# Patient Record
Sex: MALE | Race: WHITE | Employment: FULL TIME | URBAN - METROPOLITAN AREA
[De-identification: names, ages, dates, MRNs, and addresses within clinical notes are randomized per-mention and may not be internally consistent; named-entity substitution may affect disease eponyms.]

---

## 2017-10-04 ENCOUNTER — THERAPY VISIT (OUTPATIENT)
Dept: PHYSICAL THERAPY | Facility: CLINIC | Age: 52
End: 2017-10-04
Payer: COMMERCIAL

## 2017-10-04 DIAGNOSIS — M25.562 LEFT KNEE PAIN: Primary | ICD-10-CM

## 2017-10-04 DIAGNOSIS — M77.9 ENTHESOPATHY: ICD-10-CM

## 2017-10-04 PROCEDURE — 97110 THERAPEUTIC EXERCISES: CPT | Mod: GP | Performed by: PHYSICAL THERAPIST

## 2017-10-04 PROCEDURE — 97140 MANUAL THERAPY 1/> REGIONS: CPT | Mod: GP | Performed by: PHYSICAL THERAPIST

## 2017-10-04 PROCEDURE — 97161 PT EVAL LOW COMPLEX 20 MIN: CPT | Mod: GP | Performed by: PHYSICAL THERAPIST

## 2017-10-04 NOTE — LETTER
[facility] ATHLETIC [facility] PHYSICAL [facility]  [address] [address]  [phone]    2017    Re: Nikunj Cooper   :   1965  MRN:  [id number]   REFERRING PHYSICIAN:   Matt Fernandez    [facility]    Date of Initial Evaluation:  10/04/2017  Visits:  1  Rxs Used: 1  Reason for Referral:     Enthesopathy  Left knee pain    EVALUATION SUMMARY    Subjective:  Patient is a 51 year old male presenting with rehab left knee hpi. The history is provided by the patient. No  was used.   Nikunj Cooper is a 51 year old male with a left knee condition.  Condition occurred with:  Repetition/overuse.  Condition occurred: in the community.  This is a chronic condition  Date of orders 17. 6 mo ago experienced med L knee pain with walking, pretty consistently at 20 minutes. Worsened over time, saw Dr Fernandez who suspected a torn meniscus. MRI showed HS tendinopathy.   Advil 3x/day x 3 wks with improvement.  Social: Walking regularly, desk job.    Patient reports pain:  Medial.  Radiates to:  Knee.  Pain is described as aching and is intermittent and reported as 1/10.  Associated symptoms:  Loss of motion/stiffness. Pain is worse in the P.M..  Symptoms are exacerbated by ascending stairs, descending stairs and walking and relieved by rest and NSAID's.  Since onset symptoms are gradually improving.  Special tests:  MRI.      General health as reported by patient is excellent.                Objective:  Standing Alignment:   Ankle/Foot:  Pes planus L and pes planus R  Gait:    Weight Bearing Status:  WBAT   Assistive Devices:  None  Deviations:  Knee:  Knee extension decr L  Knee Evaluation:  ROM:  Prom wnl knee: dec TKE L knee.  Strength wnl knee: good QS B, HS strength 5/5 at 90 and 30 deg knee flex B, Glute max 4/5 B, glute med 4+/5 R, 3+/5 L.  Special Tests:   Left knee negative for the  following special tests:  Meninscal  Palpation:    Left knee tenderness present at:  Medial Joint Line (pes bursa); Popliteal; Semitendinosus (congestion at distal 1/3 and tendon ) and Semembranosus    Re: Nikunj Cooper   :   1965    Mobility Testing:  Mobility testing: Supine SLR decreased L with reproduction of knee pain.  Functional Testing:    Quad:    Single Leg Squat:  Left:      Right:        Bilateral Leg Squat:   Mild loss of control    Proprioception:   Stork Balance Test: Left:   Right:   % of Uninvolved:  WNL B, no inc pain with L SLS    Assessment/Plan:    Patient is a 51 year old male with left side knee complaints.    Patient has the following significant findings with corresponding treatment plan.                Diagnosis 1:  L knee distal HS tendinopathy  Pain -  manual therapy, self management, education and home program  Decreased ROM/flexibility - manual therapy, therapeutic exercise and home program  Decreased joint mobility - manual therapy, therapeutic exercise and home program  Decreased strength - therapeutic exercise, therapeutic activities and home program  Decreased proprioception - neuro re-education, gait training, therapeutic activities and home program  Therapy Evaluation Codes:   1) History comprised of:   Personal factors that impact the plan of care:      Age, Living environment, Past/current experiences, Social history/culture, Time since onset of symptoms and Work status.    Comorbidity factors that impact the plan of care are:      None.     Medications impacting care: Anti inflammatory.  2) Examination of Body Systems comprised of:   Body structures and functions that impact the plan of care:      Knee.   Activity limitations that impact the plan of care are:      Jumping, Lifting, Running, Squatting/kneeling, Stairs and Walking.  3) Clinical presentation characteristics are:   Stable/Uncomplicated.  4) Decision-Making    Low complexity using standardized  patient assessment instrument and/or measureable assessment of functional outcome.  Cumulative Therapy Evaluation is: Low complexity.  Previous and current functional limitations:  (See Goal Flow Sheet for this information)    Short term and Long term goals: (See Goal Flow Sheet for this information)   Communication ability:  Patient appears to be able to clearly communicate and understand verbal and written communication and follow directions correctly.  Treatment Explanation - The following has been discussed with the patient:   RX ordered/plan of care  Anticipated outcomes  Possible risks and side effects  This patient would benefit from PT intervention to resume normal activities.   Rehab potential is good.  Re: Nikunj Cooper   :   1965    Frequency:  1 X week, once daily  Duration:  for 4 weeks tapering to 2 X a month over 4 weeks  Discharge Plan:  Achieve all LTG.  Independent in home treatment program.  Return to previous functional level by discharge.  Reach maximal therapeutic benefit.                Thank you for your referral.    INQUIRIES  Therapist: Jess Cook, PT  [facility] [place] [facility]  [address] [place] [address]  Phone: [phone]  Fax: [phone]

## 2017-10-04 NOTE — MR AVS SNAPSHOT
"              After Visit Summary   10/4/2017    Nikunj Cooper    MRN: [id number]           Patient Information     Date Of Birth          1965        Visit Information        Provider Department      10/4/2017 10:10 AM Jess Cook PT [facility] Athletic [facility] Physical [facility]        Today's Diagnoses     Left knee pain    -  1    Enthesopathy           Follow-ups after your visit        Who to contact     If you have questions or need follow up information about today's clinic visit or your schedule please contact [facility] ATHLETIC [facility] PHYSICAL [facility] directly at [phone].  Normal or non-critical lab and imaging results will be communicated to you by Meliuzhart, letter or phone within 4 business days after the clinic has received the results. If you do not hear from us within 7 days, please contact the clinic through [employer] or phone. If you have a critical or abnormal lab result, we will notify you by phone as soon as possible.  Submit refill requests through EZ[employer] or call your pharmacy and they will forward the refill request to us. Please allow 3 business days for your refill to be completed.          Additional Information About Your Visit        MyChart Information     EZChip lets you send messages to your doctor, view your test results, renew your prescriptions, schedule appointments and more. To sign up, go to [email or web address] . Click on \"Log in\" on the left side of the screen, which will take you to the Welcome page. Then click on \"Sign up Now\" on the right side of the page.     You will be asked to enter the access code listed below, as well as some personal information. Please follow the directions to create your username and password.     Your access code is: [email or web address]  Expires: 2018 11:37 AM     Your access code will  in 90 days. If you need help or a new code, please call your [place] clinic or [phone].   "      Care EveryWhere ID     This is your Care EveryWhere ID. This could be used by other organizations to access your [place] medical records  [id number]         Blood Pressure from Last 3 Encounters:   10/14/14 110/74    Weight from Last 3 Encounters:   10/14/14 87.1 kg (192 lb)              We Performed the Following     KOLBY Inital Eval Report     Manual Ther Tech, 1+Regions, EA 15 min     PT Eval, Low Complexity (26929)     Therapeutic Exercises        Primary Care Provider    None Specified       No primary provider on file.        Equal Access to Services     [facility] : Hadii aad ku hadasho Soomaali, waaxda luqadaha, qaybta kaalmada adeegyada, waxay fadiain hayanjalin paula londono . So [facility] [phone].    ATENCIÓN: Si habla español, tiene a escamilla disposición servicios gratuitos de asistencia lingüística. Llame al [phone].    We comply with applicable federal civil rights laws and [place] laws. We do not discriminate on the basis of race, color, national origin, age, disability, sex, sexual orientation, or gender identity.            Thank you!     Thank you for choosing [place] [facility] PHYSICAL THERAPY  for your care. Our goal is always to provide you with excellent care. Hearing back from our patients is one way we can continue to improve our services. Please take a few minutes to complete the written survey that you may receive in the mail after your visit with us. Thank you!             Your Updated Medication List - Protect others around you: Learn how to safely use, store and throw away your medicines at www.disposemymeds.org.          This list is accurate as of: 10/4/17 11:37 AM.  Always use your most recent med list.                   Brand Name Dispense Instructions for use Diagnosis    IBUPROFEN PO           PRILOSEC PO

## 2017-10-04 NOTE — PROGRESS NOTES
Subjective:    Patient is a 51 year old male presenting with rehab left knee hpi. The history is provided by the patient. No  was used.   Nikunj Cooper is a 51 year old male with a left knee condition.  Condition occurred with:  Repetition/overuse.  Condition occurred: in the community.  This is a chronic condition  Date of orders 9/13/17. 6 mo ago experienced med L knee pain with walking, pretty consistently at 20 minutes. Worsened over time, saw Dr Fernandez who suspected a torn meniscus. MRI showed HS tendinopathy.     Advil 3x/day x 3 wks with improvement.    Social: Walking regularly, desk job.    Patient reports pain:  Medial.  Radiates to:  Knee.  Pain is described as aching and is intermittent and reported as 1/10.  Associated symptoms:  Loss of motion/stiffness. Pain is worse in the P.M..  Symptoms are exacerbated by ascending stairs, descending stairs and walking and relieved by rest and NSAID's.  Since onset symptoms are gradually improving.  Special tests:  MRI.      General health as reported by patient is excellent.                                              Objective:    Standing Alignment:                Ankle/Foot:  Pes planus L and pes planus R    Gait:    Weight Bearing Status:  WBAT   Assistive Devices:  None  Deviations:  Knee:  Knee extension decr L                                                      Knee Evaluation:  ROM:  Prom wnl knee: dec TKE L knee.  Strength wnl knee: good QS B, HS strength 5/5 at 90 and 30 deg knee flex B, Glute max 4/5 B, glute med 4+/5 R, 3+/5 L.              Special Tests:     Left knee negative for the following special tests:  Meninscal    Palpation:    Left knee tenderness present at:  Medial Joint Line (pes bursa); Popliteal; Semitendinosus (congestion at distal 1/3 and tendon ) and Semembranosus      Mobility Testing:  Mobility testing: Supine SLR decreased L with reproduction of knee pain.            Functional Testing:          Quad:     Single Leg Squat:  Left:      Right:        Bilateral Leg Squat:   Mild loss of control      Proprioception:   Stork Balance Test: Left:   Right:   % of Uninvolved:  WNL B, no inc pain with L SLS          General     ROS    Assessment/Plan:      Patient is a 51 year old male with left side knee complaints.    Patient has the following significant findings with corresponding treatment plan.                Diagnosis 1:  L knee distal HS tendinopathy  Pain -  manual therapy, self management, education and home program  Decreased ROM/flexibility - manual therapy, therapeutic exercise and home program  Decreased joint mobility - manual therapy, therapeutic exercise and home program  Decreased strength - therapeutic exercise, therapeutic activities and home program  Decreased proprioception - neuro re-education, gait training, therapeutic activities and home program    Therapy Evaluation Codes:   1) History comprised of:   Personal factors that impact the plan of care:      Age, Living environment, Past/current experiences, Social history/culture, Time since onset of symptoms and Work status.    Comorbidity factors that impact the plan of care are:      None.     Medications impacting care: Anti inflammatory.  2) Examination of Body Systems comprised of:   Body structures and functions that impact the plan of care:      Knee.   Activity limitations that impact the plan of care are:      Jumping, Lifting, Running, Squatting/kneeling, Stairs and Walking.  3) Clinical presentation characteristics are:   Stable/Uncomplicated.  4) Decision-Making    Low complexity using standardized patient assessment instrument and/or measureable assessment of functional outcome.  Cumulative Therapy Evaluation is: Low complexity.    Previous and current functional limitations:  (See Goal Flow Sheet for this information)    Short term and Long term goals: (See Goal Flow Sheet for this information)     Communication ability:  Patient appears  to be able to clearly communicate and understand verbal and written communication and follow directions correctly.  Treatment Explanation - The following has been discussed with the patient:   RX ordered/plan of care  Anticipated outcomes  Possible risks and side effects  This patient would benefit from PT intervention to resume normal activities.   Rehab potential is good.    Frequency:  1 X week, once daily  Duration:  for 4 weeks tapering to 2 X a month over 4 weeks  Discharge Plan:  Achieve all LTG.  Independent in home treatment program.  Return to previous functional level by discharge.  Reach maximal therapeutic benefit.    Please refer to the daily flowsheet for treatment today, total treatment time and time spent performing 1:1 timed codes.

## 2017-12-08 ENCOUNTER — THERAPY VISIT (OUTPATIENT)
Dept: PHYSICAL THERAPY | Facility: CLINIC | Age: 52
End: 2017-12-08
Payer: COMMERCIAL

## 2017-12-08 DIAGNOSIS — M25.519 SHOULDER PAIN: Primary | ICD-10-CM

## 2017-12-08 PROCEDURE — 97110 THERAPEUTIC EXERCISES: CPT | Mod: GP | Performed by: PHYSICAL THERAPIST

## 2017-12-08 PROCEDURE — 97161 PT EVAL LOW COMPLEX 20 MIN: CPT | Mod: GP | Performed by: PHYSICAL THERAPIST

## 2017-12-08 NOTE — MR AVS SNAPSHOT
"              After Visit Summary   2017    Nikunj Cooper    MRN: [id number]           Patient Information     Date Of Birth          1965        Visit Information        Provider Department      2017 11:30 AM Jess Cook PT [facility] Athletic [facility] Physical [facility]        Today's Diagnoses     Shoulder pain    -  1       Follow-ups after your visit        Who to contact     If you have questions or need follow up information about today's clinic visit or your schedule please contact [facility] ATHLETIC [facility] PHYSICAL [facility] directly at [phone].  Normal or non-critical lab and imaging results will be communicated to you by [employer], letter or phone within 4 business days after the clinic has received the results. If you do not hear from us within 7 days, please contact the clinic through [employer] or phone. If you have a critical or abnormal lab result, we will notify you by phone as soon as possible.  Submit refill requests through [email or web address] or call your pharmacy and they will forward the refill request to us. Please allow 3 business days for your refill to be completed.          Additional Information About Your Visit        MyChart Information     [email or web address] lets you send messages to your doctor, view your test results, renew your prescriptions, schedule appointments and more. To sign up, go to [email or web address] . Click on \"Log in\" on the left side of the screen, which will take you to the Welcome page. Then click on \"Sign up Now\" on the right side of the page.     You will be asked to enter the access code listed below, as well as some personal information. Please follow the directions to create your username and password.     Your access code is: [email or web address]  Expires: 2018 10:37 AM     Your access code will  in 90 days. If you need help or a new code, please call your [place] clinic or [phone].        Care EveryWhere " ID     This is your Care EveryWhere ID. This could be used by other organizations to access your [place] medical records  [id number]         Blood Pressure from Last 3 Encounters:   10/14/14 110/74    Weight from Last 3 Encounters:   10/14/14 87.1 kg (192 lb)              We Performed the Following     KOLBY Inital Eval Report     PT Eval, Low Complexity (41136)     Therapeutic Exercises        Primary Care Provider    None Specified       No primary provider on file.        Equal Access to Services     JORDAN VILLANUEVA : Hadii aad ku hadasho Soomaali, waaxda luqadaha, qaybta kaalmada adeegyada, waxay fadiain hira dobbinsmellyjacqueline londono . So [facility] [phone].    ATENCIÓN: Si habla español, tiene a escamilla disposición servicios gratuitos de asistencia lingüística. Llame al [phone].    We comply with applicable federal civil rights laws and [place] laws. We do not discriminate on the basis of race, color, national origin, age, disability, sex, sexual orientation, or gender identity.            Thank you!     Thank you for choosing [facility] PHYSICAL THERAPY  for your care. Our goal is always to provide you with excellent care. Hearing back from our patients is one way we can continue to improve our services. Please take a few minutes to complete the written survey that you may receive in the mail after your visit with us. Thank you!             Your Updated Medication List - Protect others around you: Learn how to safely use, store and throw away your medicines at www.disposemymeds.org.          This list is accurate as of: 12/8/17 11:59 PM.  Always use your most recent med list.                   Brand Name Dispense Instructions for use Diagnosis    IBUPROFEN PO           PRILOSEC PO

## 2017-12-08 NOTE — LETTER
[facility] ATHLETIC [facility] PHYSICAL [facility]  [address] [address]  [phone]    2017    Re: Nikunj Cooper   :   1965  MRN:  [id number]   REFERRING PHYSICIAN:   Matt Fernandez    [facility] ATHLETIC [facility] PHYSICAL [facility]    Date of Initial Evaluation:  2017  Visits:  1  Rxs Used: 1  Reason for Referral:  Shoulder pain    EVALUATION SUMMARY    Subjective:  Patient is a 52 year old male presenting with rehab right shoulder hpi. The history is provided by the patient.   Nikunj Cooper is a 52 year old male with a right shoulder condition.  Condition occurred with:  Other.  Condition occurred: other.  This is a new condition  S/p R shoulder SAD, DCE, IAD 17  PMH: ongoing shoulder pain, managed with cortisone shots until pain became unmanageable.  Social: R hand dominant, works in sales..    Patient reports pain:  In the joint (clavicular).    Pain is described as sharp and aching  and reported as 7/10.  Associated symptoms:  Loss of motion/stiffness and loss of strength. Pain is worse in the P.M..  Symptoms are exacerbated by lying on extremity, using arm overhead, using arm behind back, using arm at shoulder level, lifting and carrying and relieved by NSAID's, rest and ice (percoset prn).  Since onset symptoms are gradually improving.        General health as reported by patient is excellent.                  Objective:  Standing Alignment:    Cervical/Thoracic:  Forward head  Shoulder/UE:  Rounded shoulders  Shoulder Evaluation:  ROM:  AROM:    Flexion:  Left:  145    Right:  50  Abduction:  Left: 150   Right:  60  Flexion/External Rotation:  Left:  T2    Right:  R ear  Extension/Internal Rotation:  Left:  T7    Right:  Back pocket    Strength:  not assessed  Palpation:    Right shoulder tenderness present at: Clavicle; Acrimioclavicular; Incisional and Bicipital Groove      Re: Nikunj Cooper    :   1965    Assessment/Plan:    Patient is a 52 year old male with right side shoulder complaints.    Patient has the following significant findings with corresponding treatment plan.                Diagnosis 1:  S/p R shoulder SAD, DCE, IAD DOS 17  Pain -  hot/cold therapy, manual therapy, self management, education and home program  Decreased ROM/flexibility - manual therapy, therapeutic exercise and home program  Decreased joint mobility - manual therapy, therapeutic exercise and home program  Decreased strength - therapeutic exercise, therapeutic activities and home program  Decreased proprioception - neuro re-education, therapeutic activities and home program  Inflammation - cold therapy and self management/home program  Impaired posture - neuro re-education, therapeutic activities and home program  Therapy Evaluation Codes:   1) History comprised of:   Personal factors that impact the plan of care:      Age, Coping style, Gender, Living environment, Time since onset of symptoms and Work status.    Comorbidity factors that impact the plan of care are:      None.     Medications impacting care: Pain.  2) Examination of Body Systems comprised of:   Body structures and functions that impact the plan of care:      Shoulder.   Activity limitations that impact the plan of care are:      Bathing, Cooking, Driving, Dressing, Lifting, Reading/Computer work and Laying down.  3) Clinical presentation characteristics are:   Evolving/Changing.  4) Decision-Making    Low complexity using standardized patient assessment instrument and/or measureable assessment of functional outcome.  Cumulative Therapy Evaluation is: Low complexity.  Previous and current functional limitations:  (See Goal Flow Sheet for this information)    Short term and Long term goals: (See Goal Flow Sheet for this information)   Communication ability:  Patient appears to be able to clearly communicate and understand verbal and written  communication and follow directions correctly.  Treatment Explanation - The following has been discussed with the patient:   RX ordered/plan of care  Anticipated outcomes  Possible risks and side effects  This patient would benefit from PT intervention to resume normal activities.   Rehab potential is good.  Frequency:  1 X week, once daily  Duration:  for 6 weeks before return to surgeon for re-evaluation  Discharge Plan:  Achieve all LTG.  Independent in home treatment program.  Return to previous functional level by discharge.  Reach maximal therapeutic benefit.        Re: Nikunj Cooper   :   1965              Thank you for your referral.    INQUIRIES  Therapist: Jess Cook, PT  [facility] PHYSICAL THERAPY  [address] [place] [address]  Phone: [phone]  Fax: [phone]

## 2017-12-08 NOTE — PROGRESS NOTES
Subjective:    Patient is a 52 year old male presenting with rehab right shoulder hpi. The history is provided by the patient.   Nikunj Cooper is a 52 year old male with a right shoulder condition.  Condition occurred with:  Other.  Condition occurred: other.  This is a new condition  S/p R shoulder SAD, DCE, IAD 11/22/17    PMH: ongoing shoulder pain, managed with cortisone shots until pain became unmanageable.    Social: R hand dominant, works in sales..    Patient reports pain:  In the joint (clavicular).    Pain is described as sharp and aching  and reported as 7/10.  Associated symptoms:  Loss of motion/stiffness and loss of strength. Pain is worse in the P.M..  Symptoms are exacerbated by lying on extremity, using arm overhead, using arm behind back, using arm at shoulder level, lifting and carrying and relieved by NSAID's, rest and ice (percoset prn).  Since onset symptoms are gradually improving.        General health as reported by patient is excellent.                                              Objective:    Standing Alignment:    Cervical/Thoracic:  Forward head  Shoulder/UE:  Rounded shoulders                                       Shoulder Evaluation:  ROM:  AROM:    Flexion:  Left:  145    Right:  50    Abduction:  Left: 150   Right:  60                Flexion/External Rotation:  Left:  T2    Right:  R ear  Extension/Internal Rotation:  Left:  T7    Right:  Back pocket          Strength:  not assessed                          Palpation:      Right shoulder tenderness present at: Clavicle; Acrimioclavicular; Incisional and Bicipital Groove                                     General     ROS    Assessment/Plan:      Patient is a 52 year old male with right side shoulder complaints.    Patient has the following significant findings with corresponding treatment plan.                Diagnosis 1:  S/p R shoulder ALVARO, DCE, IAD DOS 11/22/17  Pain -  hot/cold therapy, manual therapy, self management,  education and home program  Decreased ROM/flexibility - manual therapy, therapeutic exercise and home program  Decreased joint mobility - manual therapy, therapeutic exercise and home program  Decreased strength - therapeutic exercise, therapeutic activities and home program  Decreased proprioception - neuro re-education, therapeutic activities and home program  Inflammation - cold therapy and self management/home program  Impaired posture - neuro re-education, therapeutic activities and home program    Therapy Evaluation Codes:   1) History comprised of:   Personal factors that impact the plan of care:      Age, Coping style, Gender, Living environment, Time since onset of symptoms and Work status.    Comorbidity factors that impact the plan of care are:      None.     Medications impacting care: Pain.  2) Examination of Body Systems comprised of:   Body structures and functions that impact the plan of care:      Shoulder.   Activity limitations that impact the plan of care are:      Bathing, Cooking, Driving, Dressing, Lifting, Reading/Computer work and Laying down.  3) Clinical presentation characteristics are:   Evolving/Changing.  4) Decision-Making    Low complexity using standardized patient assessment instrument and/or measureable assessment of functional outcome.  Cumulative Therapy Evaluation is: Low complexity.    Previous and current functional limitations:  (See Goal Flow Sheet for this information)    Short term and Long term goals: (See Goal Flow Sheet for this information)     Communication ability:  Patient appears to be able to clearly communicate and understand verbal and written communication and follow directions correctly.  Treatment Explanation - The following has been discussed with the patient:   RX ordered/plan of care  Anticipated outcomes  Possible risks and side effects  This patient would benefit from PT intervention to resume normal activities.   Rehab potential is good.    Frequency:   1 X week, once daily  Duration:  for 6 weeks before return to surgeon for re-evaluation  Discharge Plan:  Achieve all LTG.  Independent in home treatment program.  Return to previous functional level by discharge.  Reach maximal therapeutic benefit.    Please refer to the daily flowsheet for treatment today, total treatment time and time spent performing 1:1 timed codes.

## 2017-12-15 ENCOUNTER — THERAPY VISIT (OUTPATIENT)
Dept: PHYSICAL THERAPY | Facility: CLINIC | Age: 52
End: 2017-12-15
Payer: COMMERCIAL

## 2017-12-15 DIAGNOSIS — M25.519 SHOULDER PAIN: Primary | ICD-10-CM

## 2017-12-15 PROCEDURE — 97112 NEUROMUSCULAR REEDUCATION: CPT | Mod: GP | Performed by: PHYSICAL THERAPIST

## 2017-12-15 PROCEDURE — 97110 THERAPEUTIC EXERCISES: CPT | Mod: GP | Performed by: PHYSICAL THERAPIST

## 2017-12-29 ENCOUNTER — THERAPY VISIT (OUTPATIENT)
Dept: PHYSICAL THERAPY | Facility: CLINIC | Age: 52
End: 2017-12-29
Payer: COMMERCIAL

## 2017-12-29 DIAGNOSIS — M25.519 SHOULDER PAIN: ICD-10-CM

## 2017-12-29 PROCEDURE — 97112 NEUROMUSCULAR REEDUCATION: CPT | Mod: GP | Performed by: PHYSICAL THERAPIST

## 2017-12-29 PROCEDURE — 97110 THERAPEUTIC EXERCISES: CPT | Mod: GP | Performed by: PHYSICAL THERAPIST

## 2018-01-05 ENCOUNTER — THERAPY VISIT (OUTPATIENT)
Dept: PHYSICAL THERAPY | Facility: CLINIC | Age: 53
End: 2018-01-05
Payer: COMMERCIAL

## 2018-01-05 DIAGNOSIS — M25.519 SHOULDER PAIN: ICD-10-CM

## 2018-01-05 PROCEDURE — 97110 THERAPEUTIC EXERCISES: CPT | Mod: GP | Performed by: PHYSICAL THERAPIST

## 2018-01-05 PROCEDURE — 97112 NEUROMUSCULAR REEDUCATION: CPT | Mod: GP | Performed by: PHYSICAL THERAPIST

## 2018-01-12 ENCOUNTER — THERAPY VISIT (OUTPATIENT)
Dept: PHYSICAL THERAPY | Facility: CLINIC | Age: 53
End: 2018-01-12
Payer: COMMERCIAL

## 2018-01-12 DIAGNOSIS — M25.519 SHOULDER PAIN: ICD-10-CM

## 2018-01-12 PROCEDURE — 97110 THERAPEUTIC EXERCISES: CPT | Mod: GP | Performed by: PHYSICAL THERAPIST

## 2018-01-12 PROCEDURE — 97530 THERAPEUTIC ACTIVITIES: CPT | Mod: GP | Performed by: PHYSICAL THERAPIST

## 2018-01-12 PROCEDURE — 97112 NEUROMUSCULAR REEDUCATION: CPT | Mod: GP | Performed by: PHYSICAL THERAPIST

## 2018-01-16 ENCOUNTER — THERAPY VISIT (OUTPATIENT)
Dept: PHYSICAL THERAPY | Facility: CLINIC | Age: 53
End: 2018-01-16
Payer: COMMERCIAL

## 2018-01-16 ENCOUNTER — TELEPHONE (OUTPATIENT)
Dept: PHYSICAL THERAPY | Facility: CLINIC | Age: 53
End: 2018-01-16

## 2018-01-16 DIAGNOSIS — M25.519 SHOULDER PAIN: ICD-10-CM

## 2018-01-16 PROCEDURE — 97112 NEUROMUSCULAR REEDUCATION: CPT | Mod: GP | Performed by: PHYSICAL THERAPIST

## 2018-01-16 PROCEDURE — 97110 THERAPEUTIC EXERCISES: CPT | Mod: GP | Performed by: PHYSICAL THERAPIST

## 2018-01-23 ENCOUNTER — THERAPY VISIT (OUTPATIENT)
Dept: PHYSICAL THERAPY | Facility: CLINIC | Age: 53
End: 2018-01-23
Payer: COMMERCIAL

## 2018-01-23 DIAGNOSIS — M25.519 SHOULDER PAIN: ICD-10-CM

## 2018-01-23 PROCEDURE — 97112 NEUROMUSCULAR REEDUCATION: CPT | Mod: GP | Performed by: PHYSICAL THERAPIST
